# Patient Record
Sex: FEMALE | URBAN - METROPOLITAN AREA
[De-identification: names, ages, dates, MRNs, and addresses within clinical notes are randomized per-mention and may not be internally consistent; named-entity substitution may affect disease eponyms.]

---

## 2021-11-04 ENCOUNTER — LAB OUTSIDE AN ENCOUNTER (OUTPATIENT)
Dept: URBAN - METROPOLITAN AREA CLINIC 124 | Facility: CLINIC | Age: 56
End: 2021-11-04

## 2021-11-08 ENCOUNTER — OFFICE VISIT (OUTPATIENT)
Dept: URBAN - METROPOLITAN AREA CLINIC 124 | Facility: CLINIC | Age: 56
End: 2021-11-08
Payer: COMMERCIAL

## 2021-11-08 VITALS
SYSTOLIC BLOOD PRESSURE: 116 MMHG | TEMPERATURE: 97.8 F | BODY MASS INDEX: 19.56 KG/M2 | WEIGHT: 110.4 LBS | DIASTOLIC BLOOD PRESSURE: 83 MMHG | HEIGHT: 63 IN

## 2021-11-08 DIAGNOSIS — Z12.11 COLON CANCER SCREENING: ICD-10-CM

## 2021-11-08 PROCEDURE — 99203 OFFICE O/P NEW LOW 30 MIN: CPT | Performed by: INTERNAL MEDICINE

## 2021-11-08 RX ORDER — ONDANSETRON HYDROCHLORIDE 4 MG/1
1 TABLET AS NEEDED TABLET, FILM COATED ORAL ONCE A DAY
Qty: 2 | Refills: 0 | OUTPATIENT
Start: 2021-11-04

## 2021-11-08 RX ORDER — SODIUM, POTASSIUM,MAG SULFATES 17.5-3.13G
SOLUTION, RECONSTITUTED, ORAL ORAL AS DIRECTED
Qty: 177 MILLILITER | Refills: 0 | OUTPATIENT
Start: 2021-11-04 | End: 2021-11-05

## 2022-01-28 ENCOUNTER — TELEPHONE ENCOUNTER (OUTPATIENT)
Dept: URBAN - METROPOLITAN AREA CLINIC 124 | Facility: CLINIC | Age: 57
End: 2022-01-28

## 2022-02-01 ENCOUNTER — TELEPHONE ENCOUNTER (OUTPATIENT)
Dept: URBAN - METROPOLITAN AREA CLINIC 98 | Facility: CLINIC | Age: 57
End: 2022-02-01

## 2022-03-11 ENCOUNTER — OFFICE VISIT (OUTPATIENT)
Dept: URBAN - METROPOLITAN AREA TELEHEALTH 2 | Facility: TELEHEALTH | Age: 57
End: 2022-03-11
Payer: COMMERCIAL

## 2022-03-11 DIAGNOSIS — Z12.11 COLON CANCER SCREENING: ICD-10-CM

## 2022-03-11 DIAGNOSIS — K92.0 HEMATEMESIS WITHOUT NAUSEA: ICD-10-CM

## 2022-03-11 DIAGNOSIS — R13.14 PHARYNGOESOPHAGEAL DYSPHAGIA: ICD-10-CM

## 2022-03-11 PROCEDURE — 99213 OFFICE O/P EST LOW 20 MIN: CPT | Performed by: INTERNAL MEDICINE

## 2022-03-11 RX ORDER — ONDANSETRON HYDROCHLORIDE 4 MG/1
1 TABLET AS NEEDED TABLET, FILM COATED ORAL ONCE A DAY
Qty: 2 | Refills: 0 | OUTPATIENT

## 2022-03-11 RX ORDER — ONDANSETRON HYDROCHLORIDE 4 MG/1
1 TABLET AS NEEDED TABLET, FILM COATED ORAL ONCE A DAY
Qty: 2 | Refills: 0 | Status: DISCONTINUED | COMMUNITY
Start: 2021-11-04

## 2022-03-11 NOTE — HPI-TODAY'S VISIT:
She has and egd and colon scheduled for March 30th No abd pain No nausea or emesis She feels great She has no heartburn or indigestion She had an incident on Jan 1st where she had some dysphagia - took a long time for anything to pass - after abou 5 hours she was very uncomfortable and self induced some emesis - the second time she vomited it was fresh red blood - it was peak of COVID and she did not want to go to the hospital - she talked to her PCP and wanted us to address it - it never happened again

## 2022-03-16 PROBLEM — 40739000: Status: ACTIVE | Noted: 2022-03-11

## 2022-03-25 ENCOUNTER — TELEPHONE ENCOUNTER (OUTPATIENT)
Dept: URBAN - METROPOLITAN AREA CLINIC 98 | Facility: CLINIC | Age: 57
End: 2022-03-25

## 2022-03-30 ENCOUNTER — CLAIMS CREATED FROM THE CLAIM WINDOW (OUTPATIENT)
Dept: URBAN - METROPOLITAN AREA CLINIC 4 | Facility: CLINIC | Age: 57
End: 2022-03-30
Payer: COMMERCIAL

## 2022-03-30 ENCOUNTER — OFFICE VISIT (OUTPATIENT)
Dept: URBAN - METROPOLITAN AREA SURGERY CENTER 18 | Facility: SURGERY CENTER | Age: 57
End: 2022-03-30
Payer: COMMERCIAL

## 2022-03-30 DIAGNOSIS — K21.9 GASTRO-ESOPHAGEAL REFLUX DISEASE WITHOUT ESOPHAGITIS: ICD-10-CM

## 2022-03-30 DIAGNOSIS — K29.60 OTHER GASTRITIS WITHOUT BLEEDING: ICD-10-CM

## 2022-03-30 DIAGNOSIS — Z12.11 COLON CANCER SCREENING: ICD-10-CM

## 2022-03-30 DIAGNOSIS — K21.9 ACID REFLUX: ICD-10-CM

## 2022-03-30 DIAGNOSIS — K29.60 ADENOPAPILLOMATOSIS GASTRICA: ICD-10-CM

## 2022-03-30 DIAGNOSIS — B96.81 BACTERIAL INFECTION DUE TO H. PYLORI: ICD-10-CM

## 2022-03-30 DIAGNOSIS — B96.81 HELICOBACTER PYLORI [H. PYLORI] AS THE CAUSE OF DISEASES CLASSIFIED ELSEWHERE: ICD-10-CM

## 2022-03-30 DIAGNOSIS — K31.89 FOCAL FOVEOLAR HYPERPLASIA: ICD-10-CM

## 2022-03-30 PROCEDURE — 88312 SPECIAL STAINS GROUP 1: CPT | Performed by: PATHOLOGY

## 2022-03-30 PROCEDURE — 88305 TISSUE EXAM BY PATHOLOGIST: CPT | Performed by: PATHOLOGY

## 2022-03-30 PROCEDURE — G8907 PT DOC NO EVENTS ON DISCHARG: HCPCS | Performed by: INTERNAL MEDICINE

## 2022-03-30 PROCEDURE — 43239 EGD BIOPSY SINGLE/MULTIPLE: CPT | Performed by: INTERNAL MEDICINE

## 2022-03-30 PROCEDURE — G0121 COLON CA SCRN NOT HI RSK IND: HCPCS | Performed by: INTERNAL MEDICINE

## 2022-05-09 ENCOUNTER — TELEPHONE ENCOUNTER (OUTPATIENT)
Dept: URBAN - METROPOLITAN AREA TELEHEALTH 2 | Facility: TELEHEALTH | Age: 57
End: 2022-05-09

## 2022-05-10 ENCOUNTER — TELEPHONE ENCOUNTER (OUTPATIENT)
Dept: URBAN - METROPOLITAN AREA CLINIC 92 | Facility: CLINIC | Age: 57
End: 2022-05-10

## 2022-05-11 ENCOUNTER — TELEPHONE ENCOUNTER (OUTPATIENT)
Dept: URBAN - METROPOLITAN AREA CLINIC 92 | Facility: CLINIC | Age: 57
End: 2022-05-11

## 2022-05-11 ENCOUNTER — OFFICE VISIT (OUTPATIENT)
Dept: URBAN - METROPOLITAN AREA TELEHEALTH 2 | Facility: TELEHEALTH | Age: 57
End: 2022-05-11
Payer: COMMERCIAL

## 2022-05-11 DIAGNOSIS — B96.81 HELICOBACTER PYLORI [H. PYLORI] AS THE CAUSE OF DISEASES CLASSIFIED ELSEWHERE: ICD-10-CM

## 2022-05-11 DIAGNOSIS — K29.60 ADENOPAPILLOMATOSIS GASTRICA: ICD-10-CM

## 2022-05-11 PROBLEM — 708164002: Status: ACTIVE | Noted: 2022-05-10

## 2022-05-11 PROCEDURE — 99204 OFFICE O/P NEW MOD 45 MIN: CPT | Performed by: INTERNAL MEDICINE

## 2022-05-11 PROCEDURE — 83013 H PYLORI (C-13) BREATH: CPT | Performed by: INTERNAL MEDICINE

## 2022-05-11 PROCEDURE — 99214 OFFICE O/P EST MOD 30 MIN: CPT | Performed by: INTERNAL MEDICINE

## 2022-05-11 PROCEDURE — 83014 H PYLORI DRUG ADMIN: CPT | Performed by: INTERNAL MEDICINE

## 2022-05-11 RX ORDER — ONDANSETRON HYDROCHLORIDE 4 MG/1
1 TABLET AS NEEDED TABLET, FILM COATED ORAL ONCE A DAY
Qty: 2 | Refills: 0 | COMMUNITY

## 2022-05-11 RX ORDER — CLARITHROMYCIN 500 MG/1
1 TABLET TABLET, FILM COATED ORAL
Qty: 28 TABLET | Refills: 0 | OUTPATIENT
Start: 2022-05-10 | End: 2022-05-24

## 2022-05-11 RX ORDER — OMEPRAZOLE 40 MG/1
1 CAPSULE 30 MINUTES BEFORE MORNING MEAL CAPSULE, DELAYED RELEASE ORAL BID
Qty: 28 | Refills: 0 | OUTPATIENT

## 2022-05-11 RX ORDER — AMOXICILLIN 500 MG/1
2 CAPSULES CAPSULE ORAL TWICE A DAY
Qty: 56 CAPSULE | Refills: 0 | OUTPATIENT
Start: 2022-05-10 | End: 2022-05-24

## 2022-05-11 NOTE — HPI-TODAY'S VISIT:
This is a 56-year-old female referred by Dr. Gillespie for GI follow-up and a copy this note be sent to the referring provider.  I first met her back in November 2021 to set up colon cancer screening.  She denied constipation and denied any upper GI symptoms or history of anemia.  Patient is originally from [place].  I ordered a colonoscopy.  Patient then saw our PA Collin in March 2022 as she started to have some abdominal pain, nausea and vomiting.  She denied heartburn or indigestion.  She did have some dysphagia as well patient had 1 episode in January where she vomited blood.  Collin added an upper endoscopy to the colonoscopy.  Both procedures were done on March 30, 2022.  EGD showed some mild gastric erythema biopsies were sent.  Colonoscopy showed internal hemorrhoids and a slightly redundant colon but otherwise normal exam.  She did have external skin tags.  I recommended repeat exam in 5 years since her prep was fair.  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

## 2022-07-12 ENCOUNTER — LAB OUTSIDE AN ENCOUNTER (OUTPATIENT)
Dept: URBAN - METROPOLITAN AREA CLINIC 96 | Facility: CLINIC | Age: 57
End: 2022-07-12

## 2022-07-14 LAB — H PYLORI BREATH TEST: NEGATIVE

## 2022-07-18 ENCOUNTER — OFFICE VISIT (OUTPATIENT)
Dept: URBAN - METROPOLITAN AREA TELEHEALTH 2 | Facility: TELEHEALTH | Age: 57
End: 2022-07-18

## 2022-07-18 VITALS — BODY MASS INDEX: 19.84 KG/M2 | WEIGHT: 112 LBS | HEIGHT: 63 IN

## 2022-07-18 RX ORDER — OMEPRAZOLE 40 MG/1
1 CAPSULE 30 MINUTES BEFORE MORNING MEAL CAPSULE, DELAYED RELEASE ORAL BID
Qty: 28 | Refills: 0 | COMMUNITY

## 2022-07-18 RX ORDER — ONDANSETRON HYDROCHLORIDE 4 MG/1
1 TABLET AS NEEDED TABLET, FILM COATED ORAL ONCE A DAY
Qty: 2 | Refills: 0 | COMMUNITY

## 2023-04-25 ENCOUNTER — TELEPHONE ENCOUNTER (OUTPATIENT)
Dept: URBAN - METROPOLITAN AREA CLINIC 92 | Facility: CLINIC | Age: 58
End: 2023-04-25

## 2023-07-13 ENCOUNTER — OFFICE VISIT (OUTPATIENT)
Dept: URBAN - METROPOLITAN AREA CLINIC 78 | Facility: CLINIC | Age: 58
End: 2023-07-13

## 2023-10-18 ENCOUNTER — TELEPHONE ENCOUNTER (OUTPATIENT)
Dept: URBAN - METROPOLITAN AREA CLINIC 124 | Facility: CLINIC | Age: 58
End: 2023-10-18

## 2023-11-20 ENCOUNTER — OFFICE VISIT (OUTPATIENT)
Dept: URBAN - METROPOLITAN AREA CLINIC 78 | Facility: CLINIC | Age: 58
End: 2023-11-20
Payer: COMMERCIAL

## 2023-11-20 VITALS
SYSTOLIC BLOOD PRESSURE: 110 MMHG | TEMPERATURE: 98.2 F | WEIGHT: 114 LBS | BODY MASS INDEX: 20.2 KG/M2 | RESPIRATION RATE: 16 BRPM | HEART RATE: 74 BPM | HEIGHT: 63 IN | DIASTOLIC BLOOD PRESSURE: 69 MMHG

## 2023-11-20 DIAGNOSIS — L29.0 ANAL ITCHING: ICD-10-CM

## 2023-11-20 DIAGNOSIS — Z86.19 HISTORY OF HELICOBACTER INFECTION: ICD-10-CM

## 2023-11-20 PROCEDURE — 99214 OFFICE O/P EST MOD 30 MIN: CPT | Performed by: INTERNAL MEDICINE

## 2023-11-20 RX ORDER — PRAMOXINE HYDROCHLORIDE 10 MG/ML
1 APPLICATION AS NEEDED LOTION TOPICAL THREE TIMES A DAY
Qty: 1 | Refills: 3 | OUTPATIENT
Start: 2023-11-20 | End: 2024-03-19

## 2023-11-20 RX ORDER — OMEPRAZOLE 40 MG/1
1 CAPSULE 30 MINUTES BEFORE MORNING MEAL CAPSULE, DELAYED RELEASE ORAL BID
Qty: 28 | Refills: 0 | Status: ACTIVE | COMMUNITY

## 2023-11-20 RX ORDER — ONDANSETRON HYDROCHLORIDE 4 MG/1
1 TABLET AS NEEDED TABLET, FILM COATED ORAL ONCE A DAY
Qty: 2 | Refills: 0 | Status: ACTIVE | COMMUNITY

## 2023-11-20 NOTE — HPI-TODAY'S VISIT:
The patient was referred to us by Lucinda Ann for anal itching. A copy of this note willbe sent to the referring physician.  She was previously seen by Dr. Hester.  The patient had called our office to schedule an appointment for anal bleeding. Of note, she wanted t clarify today that she noticed in retrospect that she did not have any anal bleeding (she was sleeping with her daughter who has been having suicidal ideation, and blood from her daughter's period seeped into the patient's pajamas).   She has had anal itching for months. She had a pinworm tape test done. She also completed 2 empiric doses of treatment with Mike's pinworm for such but did not get better.  She states the itching is very bad and affecting her QOL greatly.  She o/w denies anal pain or soiling.   She has soft, formed BMs. Denies constipation.    She last had a colonoscopy in 2022 as detailed below. There is no FH of colon canceror colon polyps.   There is no recent history of rectal bleeding, abdominal pain, diarrhea, bloating, rectal pain, anorexia or unintentional weight loss.    Regarding any upper GI complaints, the patient has not had heartburn, nausea, vomiting or dysphagia. In Jan 2022 she had an episode of hematemesis.     The patient does not take blood thinners.   They deny any CP or STONE.     Her daughter Laurie is also a patient of mine. Laurie has been suffering from severe depression.  Summary of prior workup: - EGD/Colonoscopy in 2022: mild gastric erythema. Duodenal biopsies were normal and esophageal biopsy showed some reflux changes she had chronic H. pylori associated gastritis. Colonoscopy showed internal hemorrhoids and a slightly redundant colon but otherwise normal exam. She did have external skin tags. Repeat colon recommended in 5 years  given fair prep.

## 2023-12-01 ENCOUNTER — TELEPHONE ENCOUNTER (OUTPATIENT)
Dept: URBAN - METROPOLITAN AREA CLINIC 78 | Facility: CLINIC | Age: 58
End: 2023-12-01

## 2023-12-28 ENCOUNTER — OFFICE VISIT (OUTPATIENT)
Dept: URBAN - METROPOLITAN AREA CLINIC 78 | Facility: CLINIC | Age: 58
End: 2023-12-28
Payer: COMMERCIAL

## 2023-12-28 ENCOUNTER — DASHBOARD ENCOUNTERS (OUTPATIENT)
Age: 58
End: 2023-12-28

## 2023-12-28 VITALS
SYSTOLIC BLOOD PRESSURE: 106 MMHG | DIASTOLIC BLOOD PRESSURE: 70 MMHG | WEIGHT: 114 LBS | BODY MASS INDEX: 20.2 KG/M2 | TEMPERATURE: 98.1 F | HEART RATE: 81 BPM | HEIGHT: 63 IN | RESPIRATION RATE: 16 BRPM

## 2023-12-28 DIAGNOSIS — Z86.19 HISTORY OF HELICOBACTER INFECTION: ICD-10-CM

## 2023-12-28 DIAGNOSIS — R31.0 GROSS HEMATURIA: ICD-10-CM

## 2023-12-28 DIAGNOSIS — L29.0 ANAL ITCHING: ICD-10-CM

## 2023-12-28 PROCEDURE — 99213 OFFICE O/P EST LOW 20 MIN: CPT | Performed by: INTERNAL MEDICINE

## 2023-12-28 RX ORDER — ONDANSETRON HYDROCHLORIDE 4 MG/1
1 TABLET AS NEEDED TABLET, FILM COATED ORAL ONCE A DAY
Qty: 2 | Refills: 0 | Status: ACTIVE | COMMUNITY

## 2023-12-28 RX ORDER — PRAMOXINE HYDROCHLORIDE 10 MG/ML
1 APPLICATION AS NEEDED LOTION TOPICAL THREE TIMES A DAY
Qty: 1 | Refills: 3 | OUTPATIENT

## 2023-12-28 RX ORDER — OMEPRAZOLE 40 MG/1
1 CAPSULE 30 MINUTES BEFORE MORNING MEAL CAPSULE, DELAYED RELEASE ORAL BID
Qty: 28 | Refills: 0 | Status: ACTIVE | COMMUNITY

## 2023-12-28 RX ORDER — PRAMOXINE HYDROCHLORIDE 10 MG/ML
1 APPLICATION AS NEEDED LOTION TOPICAL THREE TIMES A DAY
Qty: 1 | Refills: 3 | Status: ACTIVE | COMMUNITY
Start: 2023-11-20 | End: 2024-03-19

## 2023-12-28 NOTE — HPI-TODAY'S VISIT:
The patient was referred to us by Lucinda Ann for anal itching. A copy of this note willbe sent to the referring physician.  She was previously seen by Dr. Hester.  The patient had called our office to schedule an appointment for anal bleeding. Of note, she wanted t clarify today that she noticed in retrospect that she did not have any anal bleeding (she was sleeping with her daughter who has been having suicidal ideation, and blood from her daughter's period seeped into the patient's pajamas).   She has had anal itching for months. She had a pinworm tape test done. She also completed 2 empiric doses of treatment with Mike's pinworm for such but did not get better.  She had initially seen me as the anal itching was very bad and affecting her QOL greatly. I started her on Pramoxine which has helped markedly.  She o/w denies anal pain or soiling.   She has a soft, formed BMs. Denies constipation.    She last had a colonoscopy in 2022 as detailed below. There is no FH of colon canceror colon polyps.   There is no recent history of rectal bleeding, abdominal pain, diarrhea, bloating, rectal pain, anorexia or unintentional weight loss.    Regarding any upper GI complaints, the patient has not had heartburn, nausea, vomiting or dysphagia. In Jan 2022 she had an episode of hematemesis.     She had evaluation by Urology for hematuria. Sheis scheduled for cystoscopy in january.  The patient does not take blood thinners.   They deny any CP or STONE.     Her daughter Laurie is also a patient of mine. Laurie has been suffering from severe depression.  Summary of prior workup: - EGD/Colonoscopy in 2022: mild gastric erythema. Duodenal biopsies were normal and esophageal biopsy showed some reflux changes she had chronic H. pylori associated gastritis. Colonoscopy showed internal hemorrhoids and a slightly redundant colon but otherwise normal exam. She did have external skin tags. Repeat colon recommended in 5 years  given fair prep.

## 2024-01-02 ENCOUNTER — OFFICE VISIT (OUTPATIENT)
Dept: URBAN - METROPOLITAN AREA CLINIC 78 | Facility: CLINIC | Age: 59
End: 2024-01-02

## 2025-04-21 NOTE — PHYSICAL EXAM HENT:
ANNUAL LUNG SCREENING NOW OVERDUE    LAST LDCT (Low Dose Lung Screening CT) done 1/29/24    Patient still meets criteria for LDCT annual screening.   Last office visit 8/28/24    Annual LDCT order placed below, if you agree please cosign order.   The LDCT program will reach out to patient to schedule scan.    Head, normocephalic, atraumatic, Face, Face within normal limits, Ears, External ears within normal limits, Nose/Nasopharynx, External nose normal appearance, nares patent, no nasal discharge, Mouth and Throat, Oral cavity appearance normal, Lips, Appearance normal